# Patient Record
Sex: FEMALE | Race: WHITE | NOT HISPANIC OR LATINO | Employment: PART TIME | URBAN - METROPOLITAN AREA
[De-identification: names, ages, dates, MRNs, and addresses within clinical notes are randomized per-mention and may not be internally consistent; named-entity substitution may affect disease eponyms.]

---

## 2024-01-03 ENCOUNTER — OFFICE VISIT (OUTPATIENT)
Dept: FAMILY MEDICINE | Facility: CLINIC | Age: 24
End: 2024-01-03

## 2024-01-03 VITALS
SYSTOLIC BLOOD PRESSURE: 118 MMHG | WEIGHT: 124 LBS | HEIGHT: 63 IN | OXYGEN SATURATION: 98 % | DIASTOLIC BLOOD PRESSURE: 74 MMHG | TEMPERATURE: 98.4 F | BODY MASS INDEX: 21.97 KG/M2 | HEART RATE: 94 BPM

## 2024-01-03 DIAGNOSIS — Z01.818 PREOPERATIVE EXAMINATION: Primary | ICD-10-CM

## 2024-01-03 DIAGNOSIS — M25.571 PAIN IN JOINT, ANKLE AND FOOT, RIGHT: ICD-10-CM

## 2024-01-03 DIAGNOSIS — Z76.89 HEALTH CARE HOME: ICD-10-CM

## 2024-01-03 DIAGNOSIS — Z71.89 ACP (ADVANCE CARE PLANNING): ICD-10-CM

## 2024-01-03 PROBLEM — S93.621A SPRAIN OF LIGAMENT OF TARSOMETATARSAL JOINT OF RIGHT FOOT: Status: ACTIVE | Noted: 2018-09-17

## 2024-01-03 PROBLEM — H53.10 SUBJECTIVE VISUAL DISTURBANCE, RIGHT EYE: Status: ACTIVE | Noted: 2018-03-09

## 2024-01-03 PROBLEM — S93.621A SPRAIN OF LIGAMENT OF TARSOMETATARSAL JOINT OF RIGHT FOOT: Status: RESOLVED | Noted: 2018-09-17 | Resolved: 2024-01-03

## 2024-01-03 PROBLEM — S89.92XA LEFT KNEE INJURY: Status: ACTIVE | Noted: 2017-02-23

## 2024-01-03 LAB
% GRANULOCYTES: 42.1 %
GLUCOSE SERPL-MCNC: 100 MG/DL (ref 60–99)
HCT VFR BLD AUTO: 39.3 % (ref 35–47)
HEMOGLOBIN: 13 G/DL (ref 11.7–15.7)
LYMPHOCYTES NFR BLD AUTO: 48.8 %
MCH RBC QN AUTO: 31 PG (ref 26–33)
MCHC RBC AUTO-ENTMCNC: 33.1 G/DL (ref 31–36)
MCV RBC AUTO: 93.7 FL (ref 78–100)
MONOCYTES NFR BLD AUTO: 9.1 %
PLATELET COUNT - QUEST: 270 10^9/L (ref 150–375)
RBC # BLD AUTO: 4.19 10*12/L (ref 3.8–5.2)
WBC # BLD AUTO: 8.6 10*9/L (ref 4–11)

## 2024-01-03 PROCEDURE — 36415 COLL VENOUS BLD VENIPUNCTURE: CPT | Performed by: FAMILY MEDICINE

## 2024-01-03 PROCEDURE — 82947 ASSAY GLUCOSE BLOOD QUANT: CPT | Performed by: FAMILY MEDICINE

## 2024-01-03 PROCEDURE — 99204 OFFICE O/P NEW MOD 45 MIN: CPT | Performed by: FAMILY MEDICINE

## 2024-01-03 PROCEDURE — 85025 COMPLETE CBC W/AUTO DIFF WBC: CPT | Performed by: FAMILY MEDICINE

## 2024-01-03 NOTE — NURSING NOTE
Chief Complaint   Patient presents with    New Patient     New patient to our clinic    Pre-Op Exam     Surgery/Procedure: Right ankle repair   Surgery Location: [facility]  Surgeon: Dr. Palafox  Surgery Date: 01/10/24

## 2024-01-03 NOTE — PROGRESS NOTES
[facility]  [address] [place] [address]  [address]  [facility] [address]  Phone: [phone]  Fax: [phone]  Primary Provider: Karen Randall  Pre-op Performing Provider: KAREN RANDALL      PREOPERATIVE EVALUATION:  Today's date: 1/3/2024    Ainsley is a 23 year old, presenting for the following:  New Patient (New patient to our clinic) and Pre-Op Exam (Surgery/Procedure: Right ankle repair /Surgery Location: [facility]/Surgeon: Dr. Palafox/Surgery Date: 01/10/24)      Surgical Information:  Surgery/Procedure: Right ankle repair   Surgery Location: [facility]  Surgeon: Dr. Palafox  Surgery Date: 01/10/24  Time of Surgery: TBD  Where patient plans to recover: At home with family  Fax number for surgical facility: [phone]    Assessment & Plan     The proposed surgical procedure is considered LOW risk.    1. Preoperative examination    - VENOUS COLLECTION  - HEMOGRAM PLATELET DIFF (BFP)  - Glucose Fasting (BFP)    2. ACP (advance care planning)      3. Health Care Home      4. Pain in joint, ankle and foot, right            - No identified additional risk factors other than previously addressed    Antiplatelet or Anticoagulation Medication Instructions:   - Patient is on no antiplatelet or anticoagulation medications.    Additional Medication Instructions:  Patient is on no additional chronic medications    RECOMMENDATION:  APPROVAL GIVEN to proceed with proposed procedure, without further diagnostic evaluation.      Subjective       HPI related to upcoming procedure: here for preop for right ankle instability and recurrent ankle sprains. Has been bothering her for about 10 years, having more problems, getting worse.    NEW PATIENT TO THE CLINIC TODAY    1. No - Have you ever had a heart attack or stroke?  2. No - Have you ever had surgery on your heart or blood vessels, such as a stent, coronary (heart) bypass, or surgery on an artery in the head, neck, heart, or  legs?  3. No - Do you have chest pain when you are physically active?  4. No - Do you have a history of heart failure?  5. No - Do you currently have a cold, bronchitis, or symptoms of other respiratory (head and chest) infections?  6. No - Do you have a cough, shortness of breath, or wheezing?  7. No - Do you or anyone in your family have a history of blood clots?  8. No - Do you or anyone in your family have a serious bleeding problem, such as long-lasting bleeding after surgeries or cuts?  9. No - Have you ever had anemia or been told to take iron pills?  10. No - Have you had any abnormal blood loss such as black, tarry or bloody stools, or abnormal vaginal bleeding?  11. No - Have you ever had a blood transfusion?  12. Yes - Are you willing to have a blood transfusion if it is medically needed before, during, or after your surgery?  13. No - Have you or anyone in your family ever had problems with anesthesia (sedation for surgery)?  14. No - Do you have sleep apnea, excessive snoring, or daytime drowsiness?   15. No - Do you have any artifical heart valves or other implanted medical devices, such as a pacemaker, defibrillator, or continuous glucose monitor?  16. No - Do you have any artifical joints?  17. No - Are you allergic to latex?  18. No - Is there any chance that you may be pregnant?condoms not currently sexually active    Health Care Directive:  Patient does not have a Health Care Directive or Living Will: Discussed advance care planning with patient; information given to patient to review.    Preoperative Review of :   reviewed - no record of controlled substances prescribed.          Review of Systems  CONSTITUTIONAL: NEGATIVE for fever, chills, change in weight  INTEGUMENTARY/SKIN: NEGATIVE for worrisome rashes, moles or lesions  EYES: NEGATIVE for vision changes or irritation  ENT/MOUTH: NEGATIVE for ear, mouth and throat problems  RESP: NEGATIVE for significant cough or SOB  CV: NEGATIVE for  "chest pain, palpitations or peripheral edema  GI: NEGATIVE for nausea, abdominal pain, heartburn, or change in bowel habits  : NEGATIVE for frequency, dysuria, or hematuria  MUSCULOSKELETAL: NEGATIVE for significant arthralgias or myalgia  NEURO: NEGATIVE for weakness, dizziness or paresthesias  ENDOCRINE: NEGATIVE for temperature intolerance, skin/hair changes  HEME: NEGATIVE for bleeding problems  PSYCHIATRIC: NEGATIVE for changes in mood or affect    Patient Active Problem List    Diagnosis Date Noted    Subjective visual disturbance, right eye 03/09/2018     Priority: Medium    Left knee injury 02/23/2017     Priority: Medium    ACP (advance care planning) 01/03/2024     Priority: Low    Health Care Home 01/03/2024     Priority: Low      No past medical history on file.  Past Surgical History:   Procedure Laterality Date    KNEE SURGERY Left      No current outpatient medications on file.       No Known Allergies     Social History     Tobacco Use    Smoking status: Never     Passive exposure: Never    Smokeless tobacco: Never   Substance Use Topics    Alcohol use: Not Currently     Alcohol/week: 1.0 standard drink of alcohol     Types: 1 Standard drinks or equivalent per week     Comment: socially     Family History   Problem Relation Age of Onset    Breast Cancer Maternal Grandmother      History   Drug Use Unknown         Objective     /74 (BP Location: Right arm, Patient Position: Sitting, Cuff Size: Adult Regular)   Pulse 94   Temp 98.4  F (36.9  C) (Temporal)   Ht 1.6 m (5' 3\")   Wt 56.2 kg (124 lb)   LMP 12/18/2023   SpO2 98%   BMI 21.97 kg/m      Physical Exam    GENERAL APPEARANCE: healthy, alert and no distress     EYES: EOMI, PERRL     HENT: ear canals and TM's normal and nose and mouth without ulcers or lesions     NECK: no adenopathy, no asymmetry, masses, or scars and thyroid normal to palpation     RESP: lungs clear to auscultation - no rales, rhonchi or wheezes     CV: regular " "rates and rhythm, normal S1 S2, no S3 or S4 and no murmur, click or rub     ABDOMEN:  soft, nontender, no HSM or masses and bowel sounds normal     MS: extremities normal- no gross deformities noted, no evidence of inflammation in joints, FROM in all extremities.     SKIN: no suspicious lesions or rashes     NEURO: Normal strength and tone, sensory exam grossly normal, mentation intact and speech normal     PSYCH: mentation appears normal. and affect normal/bright     LYMPHATICS: No cervical adenopathy    No results for input(s): \"HGB\", \"PLT\", \"INR\", \"NA\", \"POTASSIUM\", \"CR\", \"A1C\" in the last 17716 hours.     Diagnostics:  Recent Results (from the past 240 hour(s))   HEMOGRAM PLATELET DIFF (BFP)    Collection Time: 01/03/24 11:33 AM   Result Value Ref Range    WBC 8.6 4.0 - 11 10*9/L    RBC Count 4.19 3.8 - 5.2 10*12/L    Hemoglobin 13.0 11.7 - 15.7 g/dL    Hematocrit 39.3 35.0 - 47.0 %    MCV 93.7 78 - 100 fL    MCH 31.0 26 - 33 pg    MCHC 33.1 31 - 36 g/dL    Platelet Count 270 150 - 375 10^9/L    % Granulocytes 42.1 %    % Lymphocytes 48.8 %    % Monocytes 9.1 %   Glucose Fasting (BFP)    Collection Time: 01/03/24 11:33 AM   Result Value Ref Range    Glucose 100 (A) 60 - 99 mg/dL          Revised Cardiac Risk Index (RCRI):  The patient has the following serious cardiovascular risks for perioperative complications:   - No serious cardiac risks = 0 points     RCRI Interpretation: 0 points: Class I (very low risk - 0.4% complication rate)         Signed Electronically by: Karen Randall MD  Copy of this evaluation report is provided to requesting physician.      "

## 2024-01-03 NOTE — LETTER
1/3/2024        RE: Ainsley Trevizo  [address]  [facility] [address]        [facility]  [address]  [address]  [facility] [address]  Phone: [phone]  Fax: [phone]  Primary Provider: Karen Randall  Pre-op Performing Provider: KAREN RANDALL      PREOPERATIVE EVALUATION:  Today's date: 1/3/2024    Ainsley is a 23 year old, presenting for the following:  New Patient (New patient to our clinic) and Pre-Op Exam (Surgery/Procedure: Right ankle repair /Surgery Location: [facility]/Surgeon: Dr. Palafox/Surgery Date: 01/10/24)      Surgical Information:  Surgery/Procedure: Right ankle repair   Surgery Location: [facility]  Surgeon: Dr. Palafox  Surgery Date: 01/10/24  Time of Surgery: TBD  Where patient plans to recover: At home with family  Fax number for surgical facility: [phone]    Assessment & Plan    The proposed surgical procedure is considered LOW risk.    1. Preoperative examination    - VENOUS COLLECTION  - HEMOGRAM PLATELET DIFF (BFP)  - Glucose Fasting (BFP)    2. ACP (advance care planning)      3. Health Care Home      4. Pain in joint, ankle and foot, right            - No identified additional risk factors other than previously addressed    Antiplatelet or Anticoagulation Medication Instructions:   - Patient is on no antiplatelet or anticoagulation medications.    Additional Medication Instructions:  Patient is on no additional chronic medications    RECOMMENDATION:  APPROVAL GIVEN to proceed with proposed procedure, without further diagnostic evaluation.      Subjective      HPI related to upcoming procedure: here for preop for right ankle instability and recurrent ankle sprains. Has been bothering her for about 10 years, having more problems, getting worse.    NEW PATIENT TO THE CLINIC TODAY    1. No - Have you ever had a heart attack or stroke?  2. No - Have you ever had surgery on your heart or blood vessels, such as a stent,  coronary (heart) bypass, or surgery on an artery in the head, neck, heart, or legs?  3. No - Do you have chest pain when you are physically active?  4. No - Do you have a history of heart failure?  5. No - Do you currently have a cold, bronchitis, or symptoms of other respiratory (head and chest) infections?  6. No - Do you have a cough, shortness of breath, or wheezing?  7. No - Do you or anyone in your family have a history of blood clots?  8. No - Do you or anyone in your family have a serious bleeding problem, such as long-lasting bleeding after surgeries or cuts?  9. No - Have you ever had anemia or been told to take iron pills?  10. No - Have you had any abnormal blood loss such as black, tarry or bloody stools, or abnormal vaginal bleeding?  11. No - Have you ever had a blood transfusion?  12. Yes - Are you willing to have a blood transfusion if it is medically needed before, during, or after your surgery?  13. No - Have you or anyone in your family ever had problems with anesthesia (sedation for surgery)?  14. No - Do you have sleep apnea, excessive snoring, or daytime drowsiness?   15. No - Do you have any artifical heart valves or other implanted medical devices, such as a pacemaker, defibrillator, or continuous glucose monitor?  16. No - Do you have any artifical joints?  17. No - Are you allergic to latex?  18. No - Is there any chance that you may be pregnant?condoms not currently sexually active    Health Care Directive:  Patient does not have a Health Care Directive or Living Will: Discussed advance care planning with patient; information given to patient to review.    Preoperative Review of :   reviewed - no record of controlled substances prescribed.          Review of Systems  CONSTITUTIONAL: NEGATIVE for fever, chills, change in weight  INTEGUMENTARY/SKIN: NEGATIVE for worrisome rashes, moles or lesions  EYES: NEGATIVE for vision changes or irritation  ENT/MOUTH: NEGATIVE for ear, mouth and  "throat problems  RESP: NEGATIVE for significant cough or SOB  CV: NEGATIVE for chest pain, palpitations or peripheral edema  GI: NEGATIVE for nausea, abdominal pain, heartburn, or change in bowel habits  : NEGATIVE for frequency, dysuria, or hematuria  MUSCULOSKELETAL: NEGATIVE for significant arthralgias or myalgia  NEURO: NEGATIVE for weakness, dizziness or paresthesias  ENDOCRINE: NEGATIVE for temperature intolerance, skin/hair changes  HEME: NEGATIVE for bleeding problems  PSYCHIATRIC: NEGATIVE for changes in mood or affect    Patient Active Problem List    Diagnosis Date Noted     Subjective visual disturbance, right eye 03/09/2018     Priority: Medium     Left knee injury 02/23/2017     Priority: Medium     ACP (advance care planning) 01/03/2024     Priority: Low     Health Care Home 01/03/2024     Priority: Low      No past medical history on file.  Past Surgical History:   Procedure Laterality Date     KNEE SURGERY Left      No current outpatient medications on file.       No Known Allergies     Social History     Tobacco Use     Smoking status: Never     Passive exposure: Never     Smokeless tobacco: Never   Substance Use Topics     Alcohol use: Not Currently     Alcohol/week: 1.0 standard drink of alcohol     Types: 1 Standard drinks or equivalent per week     Comment: socially     Family History   Problem Relation Age of Onset     Breast Cancer Maternal Grandmother      History   Drug Use Unknown         Objective    /74 (BP Location: Right arm, Patient Position: Sitting, Cuff Size: Adult Regular)   Pulse 94   Temp 98.4  F (36.9  C) (Temporal)   Ht 1.6 m (5' 3\")   Wt 56.2 kg (124 lb)   LMP 12/18/2023   SpO2 98%   BMI 21.97 kg/m      Physical Exam    GENERAL APPEARANCE: healthy, alert and no distress     EYES: EOMI, PERRL     HENT: ear canals and TM's normal and nose and mouth without ulcers or lesions     NECK: no adenopathy, no asymmetry, masses, or scars and thyroid normal to palpation    " " RESP: lungs clear to auscultation - no rales, rhonchi or wheezes     CV: regular rates and rhythm, normal S1 S2, no S3 or S4 and no murmur, click or rub     ABDOMEN:  soft, nontender, no HSM or masses and bowel sounds normal     MS: extremities normal- no gross deformities noted, no evidence of inflammation in joints, FROM in all extremities.     SKIN: no suspicious lesions or rashes     NEURO: Normal strength and tone, sensory exam grossly normal, mentation intact and speech normal     PSYCH: mentation appears normal. and affect normal/bright     LYMPHATICS: No cervical adenopathy    No results for input(s): \"HGB\", \"PLT\", \"INR\", \"NA\", \"POTASSIUM\", \"CR\", \"A1C\" in the last 83117 hours.     Diagnostics:  Recent Results (from the past 240 hour(s))   HEMOGRAM PLATELET DIFF (BFP)    Collection Time: 01/03/24 11:33 AM   Result Value Ref Range    WBC 8.6 4.0 - 11 10*9/L    RBC Count 4.19 3.8 - 5.2 10*12/L    Hemoglobin 13.0 11.7 - 15.7 g/dL    Hematocrit 39.3 35.0 - 47.0 %    MCV 93.7 78 - 100 fL    MCH 31.0 26 - 33 pg    MCHC 33.1 31 - 36 g/dL    Platelet Count 270 150 - 375 10^9/L    % Granulocytes 42.1 %    % Lymphocytes 48.8 %    % Monocytes 9.1 %   Glucose Fasting (BFP)    Collection Time: 01/03/24 11:33 AM   Result Value Ref Range    Glucose 100 (A) 60 - 99 mg/dL          Revised Cardiac Risk Index (RCRI):  The patient has the following serious cardiovascular risks for perioperative complications:   - No serious cardiac risks = 0 points     RCRI Interpretation: 0 points: Class I (very low risk - 0.4% complication rate)         Signed Electronically by: Karen Randall MD  Copy of this evaluation report is provided to requesting physician.          Sincerely,        Karen Randall MD      "

## 2024-01-03 NOTE — LETTER
[facility]  [address]  [address]  [facility] [address]  [phone]      January 3, 2024      Ainsley Trevizo  [address] [facility] [address]      EMERGENCY CARE PLAN  Presenting Problem Treatment Plan   Questions or concerns during clinic hours I will call the clinic directly:    [facility]  [address] , [address]  [place] [address]  [phone]   Questions or concerns outside clinic hours  I will call the 24 hour line at [phone]   Patient needs to schedule an appointment  I will call the  scheduling line at [phone]   Same day treatment   I will call the clinic first, then  urgent care and/or  express care if needed   Clinic Care Coordinators Lucy Shipley RN:  [phone]  [facility] Clinical Support Staff: [phone]    Crisis Services:  Behavioral or Mental Health P (Behavioral Health Providers)   [phone]   Emergency treatment--Immediately CALL 432

## 2024-02-03 ENCOUNTER — HEALTH MAINTENANCE LETTER (OUTPATIENT)
Age: 24
End: 2024-02-03

## 2024-02-19 NOTE — PROGRESS NOTES
Preventive Care Visit  [facility], [facility].  Karen Randall MD, Family Medicine  Feb 28, 2024       SUBJECTIVE:   Ainsley is a 24 year old, presenting for the following:  No chief complaint on file.      HPI      Here for a physical.  Has never had a pap in the past. Would like one today.              Social History     Tobacco Use    Smoking status: Never     Passive exposure: Never    Smokeless tobacco: Never   Substance Use Topics    Alcohol use: Not Currently     Alcohol/week: 1.0 standard drink of alcohol     Types: 1 Standard drinks or equivalent per week     Comment: socially              No data to display            No concerns  Reviewed orders with patient.  Reviewed health maintenance and updated orders accordingly - Yes  BP Readings from Last 3 Encounters:   02/28/24 122/74   01/03/24 118/74    Wt Readings from Last 3 Encounters:   02/28/24 57.2 kg (126 lb)   01/03/24 56.2 kg (124 lb)                  Patient Active Problem List   Diagnosis    Subjective visual disturbance, right eye    Left knee injury    ACP (advance care planning)    Health Care Home     Past Surgical History:   Procedure Laterality Date    KNEE SURGERY Left        Social History     Tobacco Use    Smoking status: Never     Passive exposure: Never    Smokeless tobacco: Never   Substance Use Topics    Alcohol use: Yes     Alcohol/week: 1.0 standard drink of alcohol     Types: 1 Standard drinks or equivalent per week     Comment: socially     Family History   Problem Relation Age of Onset    Breast Cancer Maternal Grandmother          No current outpatient medications on file.       Breast Cancer Screening:  Any new diagnosis of family breast, ovarian, or bowel cancer? No  Grandmother with breast cancer, declines genetic counseling    FHS-7:        No data to display              Self breast exams    Pertinent mammograms are reviewed under the imaging tab.    History of abnormal Pap smear: due for a pap, hasn't had one  "in the past.  Not currently sexually active but has been in the past, used condoms. Doesn't want contraception now.     Reviewed and updated as needed this visit by clinical staff                  Reviewed and updated as needed this visit by Provider                  No past medical history on file.   Past Surgical History:   Procedure Laterality Date    KNEE SURGERY Left      Review of Systems    Review of Systems  Constitutional, HEENT, cardiovascular, pulmonary, gi and gu systems are negative, except as otherwise noted.    OBJECTIVE:   LMP 12/18/2023    Estimated body mass index is 21.97 kg/m  as calculated from the following:    Height as of 1/3/24: 1.6 m (5' 3\").    Weight as of 1/3/24: 56.2 kg (124 lb).  Physical Exam  GENERAL: alert and no distress  EYES: Eyes grossly normal to inspection, PERRL and conjunctivae and sclerae normal  HENT: ear canals and TM's normal, nose and mouth without ulcers or lesions  NECK: no adenopathy, no asymmetry, masses, or scars  RESP: lungs clear to auscultation - no rales, rhonchi or wheezes  BREAST: normal without masses, tenderness or nipple discharge and no palpable axillary masses or adenopathy  CV: regular rate and rhythm, normal S1 S2, no S3 or S4, no murmur, click or rub, no peripheral edema  ABDOMEN: soft, nontender, no hepatosplenomegaly, no masses and bowel sounds normal   (female) w/bimanual: normal female external genitalia, normal urethral meatus, normal vaginal mucosa, and normal cervix/adnexa/uterus without masses or discharge  MS: no gross musculoskeletal defects noted, no edema  SKIN: no suspicious lesions or rashes  NEURO: Normal strength and tone, mentation intact and speech normal  PSYCH: mentation appears normal, affect normal/bright    Diagnostic Test Results:  Labs reviewed in Epic  No results found for any visits on 02/28/24.    ASSESSMENT/PLAN:   1. Encounter for routine history and physical exam in female patient      2. Screening for cervical " cancer    - THINPREP TIS PAP W/REFL HPV mRNA E6/E7 ([facility])     Patient has been advised of split billing requirements and indicates understanding: Yes      Counseling  Reviewed preventive health counseling, as reflected in patient instructions       Regular exercise       Healthy diet/nutrition        She reports that she has never smoked. She has never been exposed to tobacco smoke. She has never used smokeless tobacco.          Signed Electronically by: Karen Randall MD

## 2024-02-28 ENCOUNTER — OFFICE VISIT (OUTPATIENT)
Dept: FAMILY MEDICINE | Facility: CLINIC | Age: 24
End: 2024-02-28

## 2024-02-28 VITALS
WEIGHT: 126 LBS | SYSTOLIC BLOOD PRESSURE: 122 MMHG | DIASTOLIC BLOOD PRESSURE: 74 MMHG | HEART RATE: 81 BPM | TEMPERATURE: 98.4 F | HEIGHT: 63 IN | OXYGEN SATURATION: 99 % | BODY MASS INDEX: 22.32 KG/M2

## 2024-02-28 DIAGNOSIS — Z00.00 ENCOUNTER FOR ROUTINE HISTORY AND PHYSICAL EXAM IN FEMALE PATIENT: Primary | ICD-10-CM

## 2024-02-28 DIAGNOSIS — Z12.4 SCREENING FOR CERVICAL CANCER: ICD-10-CM

## 2024-02-28 PROCEDURE — 99395 PREV VISIT EST AGE 18-39: CPT | Performed by: FAMILY MEDICINE

## 2024-02-28 PROCEDURE — 99459 PELVIC EXAMINATION: CPT | Performed by: FAMILY MEDICINE

## 2024-02-28 NOTE — NURSING NOTE
Chief Complaint   Patient presents with    Physical     Non-fasting today, no concerns      Pre-visit Screening:  Immunizations:  up to date  Colonoscopy:  NA  Mammogram: NA  Asthma Action Test/Plan:  NA  PHQ9:  NA  GAD7:  NA  Questioned patient about current smoking habits Pt. has never smoked.  Ok to leave detailed message on voice mail for today's visit only Yes, phone # [phone]

## 2024-03-02 LAB
CLINICAL HISTORY - QUEST: NORMAL
COMMENT - QUEST: NORMAL
COMMENT - QUEST: NORMAL
CYTOTECHNOLOGIST - QUEST: NORMAL
DESCRIPTIVE DIAGNOSIS - QUEST: NORMAL
LAST PAP DX - QUEST: NORMAL
LMP - QUEST: NORMAL
PREV BX DX - QUEST: NORMAL
SOURCE: NORMAL
STATEMENT OF ADEQUACY - QUEST: NORMAL

## 2024-06-17 PROBLEM — Z76.89 HEALTH CARE HOME: Status: RESOLVED | Noted: 2024-01-03 | Resolved: 2024-06-17

## 2025-03-29 ENCOUNTER — HEALTH MAINTENANCE LETTER (OUTPATIENT)
Age: 25
End: 2025-03-29